# Patient Record
Sex: FEMALE | Race: BLACK OR AFRICAN AMERICAN | NOT HISPANIC OR LATINO | Employment: STUDENT | URBAN - METROPOLITAN AREA
[De-identification: names, ages, dates, MRNs, and addresses within clinical notes are randomized per-mention and may not be internally consistent; named-entity substitution may affect disease eponyms.]

---
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
## 2025-06-24 ENCOUNTER — OFFICE VISIT (OUTPATIENT)
Dept: OTOLARYNGOLOGY | Facility: CLINIC | Age: 20
End: 2025-06-24
Payer: COMMERCIAL

## 2025-06-24 ENCOUNTER — LAB VISIT (OUTPATIENT)
Dept: LAB | Facility: HOSPITAL | Age: 20
End: 2025-06-24
Payer: COMMERCIAL

## 2025-06-24 VITALS — WEIGHT: 224.44 LBS

## 2025-06-24 DIAGNOSIS — J06.9 RECURRENT URI (UPPER RESPIRATORY INFECTION): ICD-10-CM

## 2025-06-24 DIAGNOSIS — H61.23 BILATERAL IMPACTED CERUMEN: ICD-10-CM

## 2025-06-24 DIAGNOSIS — J02.0 STREP PHARYNGITIS: Primary | ICD-10-CM

## 2025-06-24 PROCEDURE — 86581 STRPTCS PNEUM ANTB SEROT IA: CPT

## 2025-06-24 PROCEDURE — 86684 HEMOPHILUS INFLUENZA ANTIBDY: CPT

## 2025-06-24 PROCEDURE — 99999 PR PBB SHADOW E&M-NEW PATIENT-LVL II: CPT | Mod: PBBFAC,,, | Performed by: PHYSICIAN ASSISTANT

## 2025-06-24 PROCEDURE — 36415 COLL VENOUS BLD VENIPUNCTURE: CPT

## 2025-06-24 RX ORDER — IBUPROFEN 800 MG/1
800 TABLET, FILM COATED ORAL 3 TIMES DAILY
COMMUNITY

## 2025-06-24 RX ORDER — AMOXICILLIN AND CLAVULANATE POTASSIUM 875; 125 MG/1; MG/1
1 TABLET, FILM COATED ORAL EVERY 12 HOURS
Qty: 20 TABLET | Refills: 0 | Status: SHIPPED | OUTPATIENT
Start: 2025-06-24 | End: 2025-07-04

## 2025-06-24 NOTE — PROGRESS NOTES
Subjective:       Patient ID: Charlene Barroso is a 19 y.o. female.    Chief Complaint: Sore Throat    HPI        The pt is 19 y.o. female with a sore throat. Symptoms began 3 weeks ago. The pain is moderate. The pain is primarily on (the)  neither side. Fever is absent. There is no  history of trismus. The voice is not muffled.  Other associated symptoms hav e included: post nasal drip. Fluid intake is good. There has not been contact with an individual with known strept. Karla was trated for strep by urgent care. Given 5 days of antibitoics. Felt better. Symptoms returned shortly after completing meds.    There is no concern regarding a possible peritonsillar abscess. S/p T&A.    The patient has been treated with the following antibiotics : no recent courses . Current OTC  medications include acetaminophen, ibuprofen, throat lozenges.  There  has been improvement with this treatment regimen.      Review of Systems   Constitutional: Negative.  Negative for chills, fever and unexpected weight change.   HENT:  Positive for ear pain, postnasal drip and sore throat. Negative for facial swelling, hearing loss and trouble swallowing.    Eyes: Negative.  Negative for visual disturbance.   Respiratory:  Negative for wheezing and stridor.    Cardiovascular: Negative.  Negative for chest pain.        No CHD   Gastrointestinal: Negative.  Negative for nausea and vomiting.        Neg for GERD   Endocrine: Negative.    Genitourinary: Negative.  Negative for enuresis.        Neg for congenital abn   Musculoskeletal: Negative.  Negative for arthralgias and myalgias.   Integumentary:  Negative for rash. Negative.   Neurological: Negative.  Negative for seizures and speech difficulty.   Hematological:  Negative for adenopathy. Does not bruise/bleed easily.   Psychiatric/Behavioral: Negative.  Negative for behavioral problems.          Objective:      Physical Exam  Constitutional:       General: She is not in acute distress.      Appearance: She is well-developed.   HENT:      Head: Normocephalic.      Right Ear: Tympanic membrane, ear canal and external ear normal. No middle ear effusion. There is impacted cerumen.      Left Ear: Tympanic membrane, ear canal and external ear normal.  No middle ear effusion. There is impacted cerumen.      Nose: Nose normal. No nasal deformity.      Mouth/Throat:      Tonsils: 0 on the right. 0 on the left.     Eyes:      General: Lids are normal.      Conjunctiva/sclera: Conjunctivae normal.      Pupils: Pupils are equal, round, and reactive to light.   Neck:      Thyroid: No thyroid mass.      Trachea: Trachea normal.   Cardiovascular:      Rate and Rhythm: Normal rate and regular rhythm.   Pulmonary:      Effort: Pulmonary effort is normal. No respiratory distress.      Breath sounds: Normal breath sounds.   Musculoskeletal:         General: Normal range of motion.      Cervical back: Normal range of motion.   Lymphadenopathy:      Cervical: No cervical adenopathy.   Skin:     General: Skin is warm.      Findings: No rash.   Neurological:      Mental Status: She is alert and oriented to person, place, and time.      Cranial Nerves: No cranial nerve deficit.   Psychiatric:         Speech: Speech normal.         Behavior: Behavior normal.         Thought Content: Thought content normal.           Cerumen removal: Ears cleared under microscopic vision with curette, forceps and suction as necessary. Child appropriately restrained by parent or/and papoose board.    Assessment:       1. Strep pharyngitis        2. Recurrent URI (upper respiratory infection)  Haemophilius influenzae B Ab IgG    Streptococcus Pneumoniae IgG Antibody (23 Serotypes), MAID      3. Bilateral impacted cerumen                  Plan:       1. Augmentin bid x 10days. Patient only treated for 5 days for strep.  2. Humoral panel ordered. Pt has freq URI sxs. 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   3 . Tx pain with ibuprofen and tylenol. Warm  salt water gargles. Maintaining adequate hydration: maintaining adequate hydration may help to thin secretions and soothe the respiratory mucosa

## 2025-06-27 LAB
HAEM INFLU B IGG SER IA-MCNC: 0.39 MG/L
IMMUNOLOGIST REVIEW: NORMAL
S PN DA SERO 19F IGG SER-MCNC: 2 MCG/ML
S PNEUM DA 1 IGG SER-MCNC: 0.2 MCG/ML
S PNEUM DA 10A IGG SER-MCNC: 1 MCG/ML
S PNEUM DA 11A IGG SER-MCNC: 2.3 MCG/ML
S PNEUM DA 12F IGG SER-MCNC: 0.3 MCG/ML
S PNEUM DA 14 IGG SER-MCNC: 6.8 MCG/ML
S PNEUM DA 15B IGG SER-MCNC: 1.7 MCG/ML
S PNEUM DA 17F IGG SER-MCNC: 1.4 MCG/ML
S PNEUM DA 18C IGG SER-MCNC: 0.1 MCG/ML
S PNEUM DA 19A IGG SER-MCNC: 1.9 MCG/ML
S PNEUM DA 2 IGG SER-MCNC: 9.8 MCG/ML
S PNEUM DA 20A IGG SER-MCNC: 2.6 MCG/ML
S PNEUM DA 22F IGG SER-MCNC: 1.6 MCG/ML
S PNEUM DA 23F IGG SER-MCNC: 0.9 MCG/ML
S PNEUM DA 3 IGG SER-MCNC: 0.2 MCG/ML
S PNEUM DA 33F IGG SER-MCNC: 2.7 MCG/ML
S PNEUM DA 4 IGG SER-MCNC: 0.4 MCG/ML
S PNEUM DA 5 IGG SER-MCNC: 0.1 MCG/ML
S PNEUM DA 6B IGG SER-MCNC: 0.3 MCG/ML
S PNEUM DA 7F IGG SER-MCNC: 0.8 MCG/ML
S PNEUM DA 8 IGG SER-MCNC: 1 MCG/ML
S PNEUM DA 9N IGG SER-MCNC: 0.6 MCG/ML
S PNEUM DA 9V IGG SER-MCNC: 0.6 MCG/ML

## 2025-06-30 ENCOUNTER — PATIENT MESSAGE (OUTPATIENT)
Dept: OTOLARYNGOLOGY | Facility: CLINIC | Age: 20
End: 2025-06-30
Payer: COMMERCIAL

## 2025-06-30 ENCOUNTER — TELEPHONE (OUTPATIENT)
Dept: OTOLARYNGOLOGY | Facility: CLINIC | Age: 20
End: 2025-06-30
Payer: COMMERCIAL

## 2025-06-30 ENCOUNTER — RESULTS FOLLOW-UP (OUTPATIENT)
Dept: OTOLARYNGOLOGY | Facility: CLINIC | Age: 20
End: 2025-06-30

## 2025-06-30 DIAGNOSIS — J06.9 RECURRENT URI (UPPER RESPIRATORY INFECTION): Primary | ICD-10-CM

## 2025-06-30 NOTE — TELEPHONE ENCOUNTER
----- Message from Dinora Bender PA-C sent at 6/30/2025 10:53 AM CDT -----  Hey, I sent her a [employer] message about labs but will you call too.    Please let pt know humoral panel titers were low. This may explain why she has freq URIs    Pt may need a pneumovax booster. Call [phone] to schedule with allergy. They will administer the booster shot and repeat labs.   ----- Message -----  From: Lab, Background User  Sent: 6/27/2025  10:00 AM CDT  To: Dinora Bender PA-C

## 2025-07-03 ENCOUNTER — OFFICE VISIT (OUTPATIENT)
Dept: ALLERGY | Facility: CLINIC | Age: 20
End: 2025-07-03
Payer: COMMERCIAL

## 2025-07-03 VITALS — WEIGHT: 225.31 LBS | BODY MASS INDEX: 39.92 KG/M2 | HEIGHT: 63 IN

## 2025-07-03 DIAGNOSIS — R76.0 ABNORMAL ANTIBODY TITER: Primary | ICD-10-CM

## 2025-07-03 DIAGNOSIS — J06.9 RECURRENT URI (UPPER RESPIRATORY INFECTION): ICD-10-CM

## 2025-07-03 PROCEDURE — 99999 PR PBB SHADOW E&M-EST. PATIENT-LVL III: CPT | Mod: PBBFAC,,, | Performed by: STUDENT IN AN ORGANIZED HEALTH CARE EDUCATION/TRAINING PROGRAM

## 2025-07-03 NOTE — PROGRESS NOTES
"[facility]       HISTORY OF PRESENT ILLNESS   Referral from: Dinora Bender  CC: recurrent sore throat     HPI: Charlene Barroso is a 19 y.o. female  History obtained from patient      Recurrent sore throat: noted for the past 4 years. Usually last 2 weeks. Associated with ear fullness and LAD and subjective fevers. Does not look red. Negative for strep every single time Lasted a month this time. Usually gets steroids from UC which help. This last time she had Augmentin which also helped. She get these episodes 1-2 a year. But this was the first time that it came back so quickly.        No oral ulcers, rash, arthralgias. Otherwise no AOM, bacterial sinopulmonary infection. No admission for infections. She also denies any rhinitis sxs.     No fhx of autoimmune or immunodeficiency        MEDICAL HISTORY   SurgHx:  Past Surgical History:   Procedure Laterality Date    TONSILLECTOMY          PHYSICAL EXAM   VS: Ht 5' 3" (1.6 m)   Wt 102.2 kg (225 lb 5 oz)   BMI 39.91 kg/m²   GENERAL: NAD, well nourished, well appearing  EYES: no conjunctival injection, no discharge, no infraorbital shiners  ORAL: MMM, no ulcers, no thrush, no cobblestoning       LABS AND IMAGING     Strep pneumo titers: 10/23 serotypes        ASSESSMENT & PLAN     Recurrent sore throat: s/p tonsillectomy as a child. She has bouts 1-2 times a year for sore throat associated with subjective fever and LAD. With recent and last episode lasting a month. She does not have a hx of AOM or bacterial sinopulmonary infection that would warrant a humoral work up. Although she has never tested positive for strep, we reviewed that the cause of strep throat is streptococcus pyogenes rather streptococcus pneumoniae. Also reviewed that in a patient that has not been immunized against strep its not surprising to find low strep pneumo titers. Finally, discussed that we can provide PPSV23 to offer protection but that this will not necessarily prevent " future viral infections which seem to be causing her symptoms.     -PPSV23, no lead to repeat labs       Follow up: as needed. Good luck at nursing school!       Doni Gunter MD   [facility]

## 2025-07-03 NOTE — PROGRESS NOTES
[facility]       HISTORY OF PRESENT ILLNESS   Referral from: Dinora Bender  CC: ***    HPI: Charlene Barroso is a 19 y.o. female  History obtained from ***  ***       MEDICAL HISTORY   SurgHx:  Past Surgical History:   Procedure Laterality Date    TONSILLECTOMY          PHYSICAL EXAM   VS: There were no vitals taken for this visit.  GENERAL: NAD, well nourished, well appearing  EYES: no conjunctival injection, no discharge, no infraorbital shiners  EARS: external auditory canals normal B/L, TM normal B/L  NOSE: NT pink 2+ B/L, no polyps  ORAL: MMM, no ulcers, no thrush, no cobblestoning  LUNGS: CTAB, no w/r/c, no increased WOB  DERM: no rashes       LABS AND IMAGING     Strep pneumo titers: 10/23 serotypes        ASSESSMENT & PLAN     There are no diagnoses linked to this encounter.    Follow up: ***      Doni Gunter MD   [facility]

## 2025-07-03 NOTE — PROGRESS NOTES
PPSV23 injection administered in right  deltoid. Pt was observed for 15 min. Tolerated well w/ no effects noted.